# Patient Record
Sex: MALE | URBAN - METROPOLITAN AREA
[De-identification: names, ages, dates, MRNs, and addresses within clinical notes are randomized per-mention and may not be internally consistent; named-entity substitution may affect disease eponyms.]

---

## 2017-05-06 ENCOUNTER — EMERGENCY (EMERGENCY)
Facility: HOSPITAL | Age: 42
LOS: 1 days | Discharge: HOME | End: 2017-05-06
Attending: EMERGENCY MEDICINE
Payer: MEDICAID

## 2017-05-06 VITALS
HEIGHT: 73 IN | HEART RATE: 79 BPM | DIASTOLIC BLOOD PRESSURE: 95 MMHG | WEIGHT: 220.02 LBS | SYSTOLIC BLOOD PRESSURE: 136 MMHG | TEMPERATURE: 98 F | RESPIRATION RATE: 16 BRPM | OXYGEN SATURATION: 98 %

## 2017-05-06 DIAGNOSIS — H61.21 IMPACTED CERUMEN, RIGHT EAR: ICD-10-CM

## 2017-05-06 PROCEDURE — 99282 EMERGENCY DEPT VISIT SF MDM: CPT

## 2017-05-06 NOTE — ED PROVIDER NOTE - OBJECTIVE STATEMENT
40 y/o M pt with no PMHx and no PSHx presents to ED c/o inability to hear from R ear x2 weeks with associated R ear pain since today. Pt states the sounds he hears through his R ear ar muffled. Pt has been using Q-tips as well as administering hydrogen peroxide to his R ear with no relief of sx's. Pt denies fever, chills, or any other complaints. Pt also denies recent trauma. Pt reports that he recently traveled to [place]. NKDA.

## 2017-05-06 NOTE — ED PROVIDER NOTE - MEDICAL DECISION MAKING DETAILS
40 y/o M pt presents with inability to hear from R ear x2 weeks with associated R ear pain since today; pt has cerumen impaction in R ear. Will evaluate for R ear infection after irrigation.

## 2017-05-07 PROBLEM — Z00.00 ENCOUNTER FOR PREVENTIVE HEALTH EXAMINATION: Status: ACTIVE | Noted: 2017-05-07

## 2022-12-15 NOTE — ED ADULT TRIAGE NOTE - HEIGHT IN CM
Call Center TCM Note    Flowsheet Row Responses   [facility] patient discharged from? [facility]   Does the patient have one of the following disease processes/diagnoses(primary or secondary)? General Surgery   TCM attempt successful? No   Unsuccessful attempts Attempt 2          Alina Ngo LPN    12/15/2022, 15:22 EST       [phone]